# Patient Record
Sex: MALE | Race: WHITE | NOT HISPANIC OR LATINO | URBAN - METROPOLITAN AREA
[De-identification: names, ages, dates, MRNs, and addresses within clinical notes are randomized per-mention and may not be internally consistent; named-entity substitution may affect disease eponyms.]

---

## 2023-05-22 ENCOUNTER — WEB ENCOUNTER (OUTPATIENT)
Dept: URBAN - METROPOLITAN AREA CLINIC 54 | Facility: CLINIC | Age: 28
End: 2023-05-22

## 2023-05-22 ENCOUNTER — OFFICE VISIT (OUTPATIENT)
Dept: URBAN - METROPOLITAN AREA CLINIC 54 | Facility: CLINIC | Age: 28
End: 2023-05-22
Payer: COMMERCIAL

## 2023-05-22 ENCOUNTER — LAB OUTSIDE AN ENCOUNTER (OUTPATIENT)
Dept: URBAN - METROPOLITAN AREA CLINIC 54 | Facility: CLINIC | Age: 28
End: 2023-05-22

## 2023-05-22 VITALS
BODY MASS INDEX: 23.05 KG/M2 | TEMPERATURE: 98.7 F | HEIGHT: 70 IN | DIASTOLIC BLOOD PRESSURE: 64 MMHG | WEIGHT: 161 LBS | SYSTOLIC BLOOD PRESSURE: 116 MMHG | HEART RATE: 61 BPM

## 2023-05-22 DIAGNOSIS — K92.1 MELENA: ICD-10-CM

## 2023-05-22 DIAGNOSIS — K92.1 HEMATOCHEZIA: ICD-10-CM

## 2023-05-22 PROCEDURE — 99204 OFFICE O/P NEW MOD 45 MIN: CPT | Performed by: INTERNAL MEDICINE

## 2023-05-22 PROCEDURE — 99204 OFFICE O/P NEW MOD 45 MIN: CPT

## 2023-05-22 RX ORDER — PANTOPRAZOLE SODIUM 20 MG/1
1 TABLET TABLET, DELAYED RELEASE ORAL ONCE A DAY
Status: ON HOLD | COMMUNITY

## 2023-05-22 RX ORDER — SODIUM, POTASSIUM,MAG SULFATES 17.5-3.13G
354 ML SOLUTION, RECONSTITUTED, ORAL ORAL
Qty: 354 ML | Refills: 0 | OUTPATIENT
Start: 2023-05-22 | End: 2023-05-23

## 2023-05-22 NOTE — HPI-TODAY'S VISIT:
5/22/23: Patient is a healthy 28 year old male with PMH of C diff who presents for [facility] for ED f/u from 5/19 for rectal bleeding. States he had about a week of BRBPR mixed in stool prior to noting large, baseball sized, dark red clots passed per rectum, which prompted him to go to the ER. In the ER he had black, tarry stool. Since discharge he has continued to have minimal BRBPR, with 2-3 BMs/day. No bleeding noted yet today. He never had associated abdominal pain, diarrhea, nausea, vomiting, or fever. ER workup was positive for FOBT but otherwise unremarkable including CT and labs with negative C diff and stable Hgb around 14.8. No prior similar episodes. Denies NSAIDs. Drinks socially but lately has been drinkly daily. No tobacco. 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  CT abdomen/pelvis W/ contrast 5/19/23: - No acute infectious and/or inflammatory etiology within the abdomen or pelvis. Visualized portions of the rectum are unremarkable. - Calcification along the right adrenal gland is nonspecific but favored to be benign. No underlying mass is identified. - Hypodensity within the left kidney some of which are not characterized by current protocol, but could still reflect cysts. A nonemergent CT or MR renal protocol is suggested.

## 2023-05-25 ENCOUNTER — OFFICE VISIT (OUTPATIENT)
Dept: URBAN - METROPOLITAN AREA SURGERY CENTER 14 | Facility: SURGERY CENTER | Age: 28
End: 2023-05-25

## 2023-05-26 ENCOUNTER — OFFICE VISIT (OUTPATIENT)
Dept: URBAN - METROPOLITAN AREA SURGERY CENTER 14 | Facility: SURGERY CENTER | Age: 28
End: 2023-05-26
Payer: COMMERCIAL

## 2023-05-26 DIAGNOSIS — K63.89 ILEOCECAL VALVE STENOSIS: ICD-10-CM

## 2023-05-26 DIAGNOSIS — K63.3 ULCERATION OF COLON: ICD-10-CM

## 2023-05-26 DIAGNOSIS — K52.9 ILEITIS: ICD-10-CM

## 2023-05-26 DIAGNOSIS — K92.1 HEMATOCHEZIA: ICD-10-CM

## 2023-05-26 PROCEDURE — G8907 PT DOC NO EVENTS ON DISCHARG: HCPCS | Performed by: INTERNAL MEDICINE

## 2023-05-26 PROCEDURE — 45380 COLONOSCOPY AND BIOPSY: CPT | Performed by: INTERNAL MEDICINE

## 2023-05-26 PROCEDURE — 43235 EGD DIAGNOSTIC BRUSH WASH: CPT | Performed by: INTERNAL MEDICINE

## 2023-07-12 ENCOUNTER — OFFICE VISIT (OUTPATIENT)
Dept: URBAN - METROPOLITAN AREA CLINIC 54 | Facility: CLINIC | Age: 28
End: 2023-07-12

## 2023-07-18 ENCOUNTER — DASHBOARD ENCOUNTERS (OUTPATIENT)
Age: 28
End: 2023-07-18

## 2023-07-19 ENCOUNTER — OFFICE VISIT (OUTPATIENT)
Dept: URBAN - METROPOLITAN AREA CLINIC 54 | Facility: CLINIC | Age: 28
End: 2023-07-19

## 2023-07-19 RX ORDER — PANTOPRAZOLE SODIUM 20 MG/1
1 TABLET TABLET, DELAYED RELEASE ORAL ONCE A DAY
COMMUNITY